# Patient Record
Sex: MALE
[De-identification: names, ages, dates, MRNs, and addresses within clinical notes are randomized per-mention and may not be internally consistent; named-entity substitution may affect disease eponyms.]

---

## 2022-09-02 ENCOUNTER — HOSPITAL ENCOUNTER (OUTPATIENT)
Dept: HOSPITAL 5 - CT | Age: 67
Discharge: HOME | End: 2022-09-02
Payer: OTHER GOVERNMENT

## 2022-09-02 DIAGNOSIS — I25.10: ICD-10-CM

## 2022-09-02 DIAGNOSIS — Z87.891: ICD-10-CM

## 2022-09-02 DIAGNOSIS — J43.2: ICD-10-CM

## 2022-09-02 DIAGNOSIS — Z12.2: Primary | ICD-10-CM

## 2022-09-02 PROCEDURE — 71250 CT THORAX DX C-: CPT

## 2022-09-02 NOTE — CAT SCAN REPORT
CT CHEST WITHOUT CONTRAST



INDICATION / CLINICAL INFORMATION: Z87.891  F71.21. 20+ year smoking history.



TECHNIQUE: Axial CT images were obtained through the chest without contrast. All CT scans at this [facility]
ation are performed using CT dose reduction for ALARA by means of automated exposure control. 



COMPARISON: None available.



FINDINGS:



HEART: No significant abnormality.

CORONARY ARTERY CALCIFICATION: Moderate to severe

THORACIC AORTA: Mild atherosclerotic calcification without acute abnormality. 

MEDIASTINUM / DEEDEE: No significant abnormality.

PLEURA: No pleural effusion. No pneumothorax.

LUNGS: Mild centrilobular and paraseptal emphysematous changes are identified which are most pronounc
ed in the upper lung zones. No evidence for acute parenchymal disease, interstitial disease, nodule o
r mass.



ADDITIONAL FINDINGS: None.



UPPER ABDOMEN: No significant abnormality.



SKELETAL SYSTEM: No significant abnormality.



IMPRESSION:

1. Mild emphysematous changes. No suspicious pulmonary lesion is detected.

2. Moderate to severe coronary artery calcifications.



Signer Name: Elijah Menendez Jr, MD 

Signed: 9/2/2022 8:27 AM

Workstation Name: [id number]